# Patient Record
Sex: FEMALE | Race: OTHER | HISPANIC OR LATINO | URBAN - METROPOLITAN AREA
[De-identification: names, ages, dates, MRNs, and addresses within clinical notes are randomized per-mention and may not be internally consistent; named-entity substitution may affect disease eponyms.]

---

## 2021-07-22 ENCOUNTER — OFFICE VISIT (OUTPATIENT)
Dept: URBAN - METROPOLITAN AREA CLINIC 121 | Facility: CLINIC | Age: 46
End: 2021-07-22

## 2022-02-03 ENCOUNTER — OFFICE VISIT (OUTPATIENT)
Dept: URBAN - METROPOLITAN AREA CLINIC 60 | Facility: CLINIC | Age: 47
End: 2022-02-03

## 2022-03-28 ENCOUNTER — OFFICE VISIT (OUTPATIENT)
Dept: URBAN - METROPOLITAN AREA CLINIC 60 | Facility: CLINIC | Age: 47
End: 2022-03-28

## 2022-05-13 ENCOUNTER — OFFICE VISIT (OUTPATIENT)
Dept: URBAN - METROPOLITAN AREA SURGERY CENTER 4 | Facility: SURGERY CENTER | Age: 47
End: 2022-05-13

## 2022-05-18 LAB — PATHOLOGY (INDENTED REPORT): (no result)

## 2022-05-23 ENCOUNTER — LAB OUTSIDE AN ENCOUNTER (OUTPATIENT)
Dept: URBAN - METROPOLITAN AREA CLINIC 121 | Facility: CLINIC | Age: 47
End: 2022-05-23

## 2022-05-26 LAB
% SATURATION: (no result)
ABSOLUTE BASOPHILS: (no result)
ABSOLUTE EOSINOPHILS: (no result)
ABSOLUTE LYMPHOCYTES: (no result)
ABSOLUTE MONOCYTES: (no result)
ABSOLUTE NEUTROPHILS: (no result)
ALBUMIN/GLOBULIN RATIO: (no result)
ALBUMIN: (no result)
ALKALINE PHOSPHATASE: (no result)
ALT: (no result)
AST: (no result)
BASOPHILS: (no result)
BILIRUBIN, TOTAL: (no result)
BUN/CREATININE RATIO: (no result)
CALCIUM: (no result)
CARBON DIOXIDE: (no result)
CHLORIDE: (no result)
CREATININE: (no result)
EGFR AFRICAN AMERIC: (no result)
EGFR NON-AFR. AMERI: (no result)
EOSINOPHILS: (no result)
FERRITIN: (no result)
FOLATE, SERUM: (no result)
GLOBULIN: (no result)
GLUCOSE: (no result)
HEMATOCRIT: (no result)
HEMOGLOBIN: (no result)
HEPATITIS A IGM: (no result)
HEPATITIS B CORE AB TOTAL: (no result)
HEPATITIS B CORE ANTIBODY (IGM): (no result)
HEPATITIS B SURFACE ANTIBODY QL: (no result)
HEPATITIS B SURFACE ANTIGEN: (no result)
HEPATITIS C ANTIBODY: (no result)
INDEX: 0.02
INR: 1
INTRINSIC FACTOR BLOCKING ANTIBODY: NEGATIVE
IRON BINDING CAPACITY: (no result)
IRON, TOTAL: (no result)
LYMPHOCYTES: (no result)
MCH: (no result)
MCHC: (no result)
MCV: (no result)
MITOCHONDRIAL AB SCREEN: NEGATIVE
MONOCYTES: (no result)
MPV: (no result)
NEUTROPHILS: (no result)
PLATELET COUNT: (no result)
POTASSIUM: (no result)
PROTEIN, TOTAL: (no result)
PT: (no result)
RDW: (no result)
RED BLOOD CELL COUNT: (no result)
SODIUM: (no result)
UREA NITROGEN (BUN): (no result)
VITAMIN B12: (no result)
WHITE BLOOD CELL COUNT: (no result)

## 2022-06-01 LAB
HEPATITIS A AB, TOTAL W/REFL IGM: REACTIVE
HEPATITIS A IGM: (no result)
IMMUNOGLOBULIN A: (no result)
INTERPRETATION: (no result)
TISSUE TRANSGLUTAMINASE AB, IGA: (no result)

## 2022-06-06 ENCOUNTER — OFFICE VISIT (OUTPATIENT)
Dept: URBAN - METROPOLITAN AREA CLINIC 60 | Facility: CLINIC | Age: 47
End: 2022-06-06

## 2022-07-09 ENCOUNTER — TELEPHONE ENCOUNTER (OUTPATIENT)
Dept: URBAN - METROPOLITAN AREA CLINIC 121 | Facility: CLINIC | Age: 47
End: 2022-07-09

## 2022-07-09 RX ORDER — OMEPRAZOLE 40 MG/1
TWICE A DAY; ONE CAPSULE 30MIN BEFORE BREAKFAST ONE CAPSULE 30MIN BEFORE DINNER CAPSULE, DELAYED RELEASE ORAL TWICE A DAY
Refills: 1 | OUTPATIENT
Start: 2022-05-13 | End: 2022-03-28

## 2022-07-09 RX ORDER — IBUPROFEN 800 MG/1
TABLET, FILM COATED ORAL
Refills: 0 | OUTPATIENT
Start: 2022-01-12 | End: 2022-06-06

## 2022-07-09 RX ORDER — SUCRALFATE 1 G/1
FOUR TIMES A DAY; TAKE ON EMPTY STOMACH 1 HR BEFORE OR 2 HOURS AFTER MEALS/OTHER MEDICATION TABLET ORAL
Refills: 1 | OUTPATIENT
Start: 2022-06-06 | End: 2022-06-06

## 2022-07-09 RX ORDER — ONDANSETRON HYDROCHLORIDE 4 MG/1
TABLET, FILM COATED ORAL
Refills: 0 | OUTPATIENT
Start: 2021-11-15 | End: 2022-06-06

## 2022-07-10 ENCOUNTER — TELEPHONE ENCOUNTER (OUTPATIENT)
Dept: URBAN - METROPOLITAN AREA CLINIC 121 | Facility: CLINIC | Age: 47
End: 2022-07-10

## 2022-07-10 RX ORDER — ONDANSETRON HYDROCHLORIDE 4 MG/1
TAKE ONE TABLET PO 30 MINS BEFORE COLONOSCOPY PREP AND ONE TABLET PO 6 HOURS INTO PREP TABLET, FILM COATED ORAL
Refills: 0 | Status: ACTIVE | COMMUNITY
Start: 2022-03-28

## 2022-07-10 RX ORDER — GABAPENTIN 300 MG/1
CAPSULE ORAL
Refills: 0 | Status: ACTIVE | COMMUNITY
Start: 2022-01-12

## 2022-07-10 RX ORDER — FUROSEMIDE 20 MG/1
TABLET ORAL
Refills: 0 | Status: ACTIVE | COMMUNITY
Start: 2021-11-15

## 2022-07-10 RX ORDER — OMEPRAZOLE 40 MG/1
TWICE A DAY; ONE CAPSULE 30MIN BEFORE BREAKFAST ONE CAPSULE 30MIN BEFORE DINNER CAPSULE, DELAYED RELEASE ORAL TWICE A DAY
Refills: 1 | Status: ACTIVE | COMMUNITY
Start: 2022-05-13

## 2022-07-10 RX ORDER — OMEPRAZOLE 40 MG/1
TAKE ONE CAPSULE BY MOUTH ONCE A DAY 30 MINS BEFORE BREAKFAST CAPSULE, DELAYED RELEASE ORAL ONCE A DAY
Refills: 1 | Status: ACTIVE | COMMUNITY
Start: 2022-03-28

## 2022-07-10 RX ORDER — TOPIRAMATE 100 MG/1
TABLET ORAL
Refills: 0 | Status: ACTIVE | COMMUNITY
Start: 2022-01-12

## 2022-07-10 RX ORDER — METOPROLOL SUCCINATE 25 MG/1
TABLET, EXTENDED RELEASE ORAL
Refills: 0 | Status: ACTIVE | COMMUNITY
Start: 2022-01-12

## 2022-07-10 RX ORDER — HYDROXYCHLOROQUINE SULFATE 200 MG/1
TABLET, FILM COATED ORAL
Refills: 0 | Status: ACTIVE | COMMUNITY
Start: 2022-03-25

## 2022-07-10 RX ORDER — CHOLECALCIFEROL (VITAMIN D3) 1250 MCG
CAPSULE ORAL
Refills: 0 | Status: ACTIVE | COMMUNITY
Start: 2021-08-07

## 2022-07-10 RX ORDER — BUPROPION HYDROCHLORIDE 300 MG/1
TABLET, EXTENDED RELEASE ORAL
Refills: 0 | Status: ACTIVE | COMMUNITY
Start: 2021-07-29

## 2022-07-10 RX ORDER — OMEPRAZOLE 40 MG/1
TWICE A DAY; ONE CAPSULE 30MIN BEFORE BREAKFAST ONE CAPSULE 30MIN BEFORE DINNER CAPSULE, DELAYED RELEASE ORAL TWICE A DAY
Refills: 1 | Status: ACTIVE | COMMUNITY
Start: 2022-06-06

## 2022-07-10 RX ORDER — SUCRALFATE 1 G/1
FOUR TIMES A DAY; TAKE ON EMPTY STOMACH 1 HR BEFORE OR 2 HOURS AFTER MEALS/OTHER MEDICATION TABLET ORAL
Refills: 1 | Status: ACTIVE | COMMUNITY
Start: 2022-03-28

## 2022-07-10 RX ORDER — PROMETHAZINE HYDROCHLORIDE AND DEXTROMETHORPHAN HYDROBROMIDE ORAL SOLUTION 15; 6.25 MG/5ML; MG/5ML
SOLUTION ORAL
Refills: 0 | Status: ACTIVE | COMMUNITY
Start: 2021-11-27

## 2022-07-10 RX ORDER — FAMOTIDINE 40 MG/1
TABLET, FILM COATED ORAL
Refills: 0 | Status: ACTIVE | COMMUNITY
Start: 2021-08-07

## 2022-07-10 RX ORDER — SUCRALFATE 1 G/1
TAKE 1 TABLET BY MOUTH ON AN EMPTY STOMACH FOUR TIMES DAILY ONE HOUR BEFORE OR 2 HOURS AFTER MEALS/ OTHER MEDICATION TABLET ORAL
Refills: 1 | Status: ACTIVE | COMMUNITY
Start: 2022-06-06

## 2022-07-10 RX ORDER — IBUPROFEN 800 MG/1
TABLET, FILM COATED ORAL
Refills: 0 | Status: ACTIVE | COMMUNITY
Start: 2022-02-21

## 2022-07-10 RX ORDER — ALBUTEROL SULFATE 90 UG/1
INHALANT RESPIRATORY (INHALATION)
Refills: 0 | Status: ACTIVE | COMMUNITY
Start: 2021-11-27

## 2022-07-10 RX ORDER — POLYETHYLENE GLYCOL 3350, SODIUM SULFATE ANHYDROUS, SODIUM BICARBONATE, SODIUM CHLORIDE, POTASSIUM CHLORIDE 236; 22.74; 6.74; 5.86; 2.97 G/4L; G/4L; G/4L; G/4L; G/4L
TAKE AS DIRECTED POWDER, FOR SOLUTION ORAL TAKE AS DIRECTED
Refills: 0 | Status: ACTIVE | COMMUNITY
Start: 2022-03-28

## 2022-07-11 PROBLEM — 698065002 ACID REFLUX: Status: ACTIVE | Noted: 2022-07-11

## 2022-07-15 ENCOUNTER — CLAIMS CREATED FROM THE CLAIM WINDOW (OUTPATIENT)
Dept: URBAN - METROPOLITAN AREA SURGERY CENTER 4 | Facility: SURGERY CENTER | Age: 47
End: 2022-07-15
Payer: COMMERCIAL

## 2022-07-15 DIAGNOSIS — K29.70 GASTRITIS, UNSPECIFIED, WITHOUT BLEEDING: ICD-10-CM

## 2022-07-15 DIAGNOSIS — K20.90 ESOPHAGITIS, UNSPECIFIED WITHOUT BLEEDING: ICD-10-CM

## 2022-07-15 DIAGNOSIS — K44.9 HERNIA, HIATAL: ICD-10-CM

## 2022-07-15 PROCEDURE — G8907 PT DOC NO EVENTS ON DISCHARG: HCPCS | Performed by: INTERNAL MEDICINE

## 2022-07-15 PROCEDURE — 43239 EGD BIOPSY SINGLE/MULTIPLE: CPT | Performed by: INTERNAL MEDICINE

## 2022-07-15 PROCEDURE — G8918 PT W/O PREOP ORDER IV AB PRO: HCPCS | Performed by: INTERNAL MEDICINE

## 2022-07-15 RX ORDER — GABAPENTIN 300 MG/1
CAPSULE ORAL
Refills: 0 | Status: ACTIVE | COMMUNITY
Start: 2022-01-12

## 2022-07-15 RX ORDER — IBUPROFEN 800 MG/1
TABLET, FILM COATED ORAL
Refills: 0 | Status: ACTIVE | COMMUNITY
Start: 2022-02-21

## 2022-07-15 RX ORDER — FUROSEMIDE 20 MG/1
TABLET ORAL
Refills: 0 | Status: ACTIVE | COMMUNITY
Start: 2021-11-15

## 2022-07-15 RX ORDER — FAMOTIDINE 40 MG/1
TABLET, FILM COATED ORAL
Refills: 0 | Status: ACTIVE | COMMUNITY
Start: 2021-08-07

## 2022-07-15 RX ORDER — OMEPRAZOLE 40 MG/1
TAKE ONE CAPSULE BY MOUTH ONCE A DAY 30 MINS BEFORE BREAKFAST CAPSULE, DELAYED RELEASE ORAL ONCE A DAY
Refills: 1 | Status: ACTIVE | COMMUNITY
Start: 2022-03-28

## 2022-07-15 RX ORDER — SUCRALFATE 1 G/1
TAKE 1 TABLET BY MOUTH ON AN EMPTY STOMACH FOUR TIMES DAILY ONE HOUR BEFORE OR 2 HOURS AFTER MEALS/ OTHER MEDICATION TABLET ORAL
Refills: 1 | Status: ACTIVE | COMMUNITY
Start: 2022-06-06

## 2022-07-15 RX ORDER — CHOLECALCIFEROL (VITAMIN D3) 1250 MCG
CAPSULE ORAL
Refills: 0 | Status: ACTIVE | COMMUNITY
Start: 2021-08-07

## 2022-07-15 RX ORDER — HYDROXYCHLOROQUINE SULFATE 200 MG/1
TABLET, FILM COATED ORAL
Refills: 0 | Status: ACTIVE | COMMUNITY
Start: 2022-03-25

## 2022-07-15 RX ORDER — POLYETHYLENE GLYCOL 3350, SODIUM SULFATE ANHYDROUS, SODIUM BICARBONATE, SODIUM CHLORIDE, POTASSIUM CHLORIDE 236; 22.74; 6.74; 5.86; 2.97 G/4L; G/4L; G/4L; G/4L; G/4L
TAKE AS DIRECTED POWDER, FOR SOLUTION ORAL TAKE AS DIRECTED
Refills: 0 | Status: ACTIVE | COMMUNITY
Start: 2022-03-28

## 2022-07-15 RX ORDER — ALBUTEROL SULFATE 90 UG/1
INHALANT RESPIRATORY (INHALATION)
Refills: 0 | Status: ACTIVE | COMMUNITY
Start: 2021-11-27

## 2022-07-15 RX ORDER — TOPIRAMATE 100 MG/1
TABLET ORAL
Refills: 0 | Status: ACTIVE | COMMUNITY
Start: 2022-01-12

## 2022-07-15 RX ORDER — PROMETHAZINE HYDROCHLORIDE AND DEXTROMETHORPHAN HYDROBROMIDE ORAL SOLUTION 15; 6.25 MG/5ML; MG/5ML
SOLUTION ORAL
Refills: 0 | Status: ACTIVE | COMMUNITY
Start: 2021-11-27

## 2022-07-15 RX ORDER — BUPROPION HYDROCHLORIDE 300 MG/1
TABLET, EXTENDED RELEASE ORAL
Refills: 0 | Status: ACTIVE | COMMUNITY
Start: 2021-07-29

## 2022-07-15 RX ORDER — METOPROLOL SUCCINATE 25 MG/1
TABLET, EXTENDED RELEASE ORAL
Refills: 0 | Status: ACTIVE | COMMUNITY
Start: 2022-01-12

## 2022-07-15 RX ORDER — ONDANSETRON HYDROCHLORIDE 4 MG/1
TAKE ONE TABLET PO 30 MINS BEFORE COLONOSCOPY PREP AND ONE TABLET PO 6 HOURS INTO PREP TABLET, FILM COATED ORAL
Refills: 0 | Status: ACTIVE | COMMUNITY
Start: 2022-03-28

## 2022-07-30 ENCOUNTER — TELEPHONE ENCOUNTER (OUTPATIENT)
Age: 47
End: 2022-07-30

## 2022-07-31 ENCOUNTER — TELEPHONE ENCOUNTER (OUTPATIENT)
Age: 47
End: 2022-07-31

## 2022-07-31 RX ORDER — FERROUS SULFATE 325(65) MG
1 (ONE) TABLET ORAL
Qty: 0 | Refills: 4 | Status: ACTIVE | COMMUNITY
Start: 2017-11-15

## 2022-08-01 ENCOUNTER — OFFICE VISIT (OUTPATIENT)
Dept: URBAN - METROPOLITAN AREA CLINIC 60 | Facility: CLINIC | Age: 47
End: 2022-08-01
Payer: COMMERCIAL

## 2022-08-01 ENCOUNTER — WEB ENCOUNTER (OUTPATIENT)
Dept: URBAN - METROPOLITAN AREA CLINIC 60 | Facility: CLINIC | Age: 47
End: 2022-08-01

## 2022-08-01 VITALS
HEIGHT: 64 IN | DIASTOLIC BLOOD PRESSURE: 80 MMHG | TEMPERATURE: 97.6 F | WEIGHT: 246 LBS | SYSTOLIC BLOOD PRESSURE: 126 MMHG | BODY MASS INDEX: 42 KG/M2 | HEART RATE: 82 BPM | RESPIRATION RATE: 20 BRPM | OXYGEN SATURATION: 96 %

## 2022-08-01 DIAGNOSIS — K59.00 CONSTIPATION, UNSPECIFIED CONSTIPATION TYPE: ICD-10-CM

## 2022-08-01 DIAGNOSIS — K76.0 FATTY LIVER: ICD-10-CM

## 2022-08-01 DIAGNOSIS — R10.10 UPPER ABDOMINAL PAIN: ICD-10-CM

## 2022-08-01 DIAGNOSIS — R16.1 SPLENOMEGALY: ICD-10-CM

## 2022-08-01 DIAGNOSIS — K21.00 GASTROESOPHAGEAL REFLUX DISEASE WITH ESOPHAGITIS WITHOUT HEMORRHAGE: ICD-10-CM

## 2022-08-01 DIAGNOSIS — Z86.010 PERSONAL HISTORY OF COLONIC POLYPS: ICD-10-CM

## 2022-08-01 PROBLEM — 429699009: Status: RESOLVED | Noted: 2022-07-29

## 2022-08-01 PROCEDURE — 99213 OFFICE O/P EST LOW 20 MIN: CPT | Performed by: PHYSICIAN ASSISTANT

## 2022-08-01 RX ORDER — ALBUTEROL SULFATE 90 UG/1
INHALANT RESPIRATORY (INHALATION)
Refills: 0 | Status: ACTIVE | COMMUNITY
Start: 2021-11-27

## 2022-08-01 RX ORDER — CHOLECALCIFEROL (VITAMIN D3) 1250 MCG
CAPSULE ORAL
Refills: 0 | Status: ACTIVE | COMMUNITY
Start: 2021-08-07

## 2022-08-01 RX ORDER — BUPROPION HYDROCHLORIDE 300 MG/1
TABLET, EXTENDED RELEASE ORAL
Refills: 0 | Status: ACTIVE | COMMUNITY
Start: 2021-07-29

## 2022-08-01 RX ORDER — IBUPROFEN 800 MG/1
TABLET, FILM COATED ORAL
Refills: 0 | Status: ACTIVE | COMMUNITY
Start: 2022-02-21

## 2022-08-01 RX ORDER — GABAPENTIN 300 MG/1
CAPSULE ORAL
Refills: 0 | Status: ACTIVE | COMMUNITY
Start: 2022-01-12

## 2022-08-01 RX ORDER — HYDROXYCHLOROQUINE SULFATE 200 MG/1
TABLET, FILM COATED ORAL
Refills: 0 | Status: ACTIVE | COMMUNITY
Start: 2022-03-25

## 2022-08-01 RX ORDER — FAMOTIDINE 40 MG/1
TABLET, FILM COATED ORAL
Refills: 0 | Status: ACTIVE | COMMUNITY
Start: 2021-08-07

## 2022-08-01 RX ORDER — OMEPRAZOLE 40 MG/1
TAKE ONE CAPSULE BY MOUTH ONCE A DAY 30 MINS BEFORE BREAKFAST CAPSULE, DELAYED RELEASE ORAL ONCE A DAY
Refills: 1 | Status: ACTIVE | COMMUNITY
Start: 2022-03-28

## 2022-08-01 RX ORDER — ONDANSETRON HYDROCHLORIDE 4 MG/1
TAKE ONE TABLET PO 30 MINS BEFORE COLONOSCOPY PREP AND ONE TABLET PO 6 HOURS INTO PREP TABLET, FILM COATED ORAL
Refills: 0 | Status: ACTIVE | COMMUNITY
Start: 2022-03-28

## 2022-08-01 RX ORDER — TOPIRAMATE 100 MG/1
TABLET ORAL
Refills: 0 | Status: ACTIVE | COMMUNITY
Start: 2022-01-12

## 2022-08-01 RX ORDER — PROMETHAZINE HYDROCHLORIDE AND DEXTROMETHORPHAN HYDROBROMIDE ORAL SOLUTION 15; 6.25 MG/5ML; MG/5ML
SOLUTION ORAL
Refills: 0 | Status: ACTIVE | COMMUNITY
Start: 2021-11-27

## 2022-08-01 RX ORDER — METOPROLOL SUCCINATE 25 MG/1
TABLET, EXTENDED RELEASE ORAL
Refills: 0 | Status: ACTIVE | COMMUNITY
Start: 2022-01-12

## 2022-08-01 RX ORDER — PREDNISONE 5 MG/1
1 TABLET TABLET ORAL BID
Status: ACTIVE | COMMUNITY

## 2022-08-01 RX ORDER — FUROSEMIDE 20 MG/1
TABLET ORAL
Refills: 0 | Status: ACTIVE | COMMUNITY
Start: 2021-11-15

## 2022-08-01 NOTE — HPI-TODAY'S VISIT:
Radha is a pleasant 47-year-old female who presents today for procedure follow up.   MRI/pancreas/MRCP with and without contrast dated 6/27/2022 showed splenomegaly  EGD dated 7/15/2022 demonstrated a regular Z-line.  Small hiatal hernia.  Gastritis.  Normal duodenum.  Evidence of gastric sleeve. Lower third esophageal biopsies demonstrated squamocolumnar mucosa with mild reflux type changes and mild chronic inflammation of columnar gastric type mucosa. No evidence of intestinal metaplasia, or dysplasia.   FibroScan dated 4/18/2022 showed S0 and F0    Ultrasound dated 4/18/2022 showed interval cholecystectomy with normal caliber common bile duct.  Spleen at the upper limits of normal in size but unchanged from prior exam.  Otherwise negative exam    EGD dated 5/13/2022 showed LA grade B esophagitis.  Vertical banded gastroplasty.  Gastritis.  Normal duodenum. Small bowel mucosa without significant histopathologic diagnosis.  No evidence of villous atrophy or sprue.  Small bowel mucosa with Brunner's glands.  Antral mucosa negative for H. pylori.  Lower third esophageal biopsies with erosive esophagitis.    Labs dated 5/23/2022 showed a normal CBC.  Normal CMP.  Hepatitis panel negative.  Intrinsic factor blocking antibody negative.  Iron studies normal.  AMA normal.  PT/INR normal.  Vitamin B12/folate normal.    Colonoscopy dated 5/13/2022 showed a 5 mm tubular adenoma removed from the mid ascending colon.  Diverticulosis in the sigmoid colon.  Ileum normal.  Nonbleeding internal hemorrhoids.  Repeat colonoscopy in 1 year due to suboptimal prep.    Gastric parietal cell antibody dated 1/10/2022 normal    No issues after procedure. S/P gastric sleeve. Improvement in nausea, vomiting, heartburn, reflux and epigastric pain with omeprazole 40 mg BID and sucralfate. Notes a bowel movement every other day with MiraLAX. Denies dysphagia, odynophagia, hematemesis, coffee-ground emesis, abnormal weight loss, BRBPR or melena. Maternal history of colon cancer at age 51.  No other GI complaints at this time

## 2022-08-01 NOTE — PHYSICAL EXAM CONSTITUTIONAL:
obese, well developed, well nourished , in no acute distress , ambulating without difficulty , normal communication ability

## 2022-08-03 PROBLEM — 196731005 GASTRODUODENITIS: Status: ACTIVE | Noted: 2022-08-03

## 2022-11-07 ENCOUNTER — OFFICE VISIT (OUTPATIENT)
Dept: URBAN - METROPOLITAN AREA CLINIC 60 | Facility: CLINIC | Age: 47
End: 2022-11-07
Payer: COMMERCIAL

## 2022-11-07 VITALS
HEIGHT: 64 IN | WEIGHT: 252 LBS | BODY MASS INDEX: 43.02 KG/M2 | SYSTOLIC BLOOD PRESSURE: 128 MMHG | HEART RATE: 78 BPM | RESPIRATION RATE: 20 BRPM | TEMPERATURE: 97.4 F | DIASTOLIC BLOOD PRESSURE: 78 MMHG | OXYGEN SATURATION: 99 %

## 2022-11-07 DIAGNOSIS — K21.00 GASTROESOPHAGEAL REFLUX DISEASE WITH ESOPHAGITIS WITHOUT HEMORRHAGE: ICD-10-CM

## 2022-11-07 DIAGNOSIS — R16.1 SPLENOMEGALY: ICD-10-CM

## 2022-11-07 DIAGNOSIS — K59.00 CONSTIPATION, UNSPECIFIED CONSTIPATION TYPE: ICD-10-CM

## 2022-11-07 DIAGNOSIS — Z86.010 PERSONAL HISTORY OF COLONIC POLYPS: ICD-10-CM

## 2022-11-07 PROBLEM — 428283002: Status: ACTIVE | Noted: 2022-08-01

## 2022-11-07 PROBLEM — 266433003: Status: ACTIVE | Noted: 2022-07-29

## 2022-11-07 PROBLEM — 14760008: Status: ACTIVE | Noted: 2022-07-29

## 2022-11-07 PROBLEM — 197321007: Status: ACTIVE | Noted: 2022-07-29

## 2022-11-07 PROCEDURE — 99213 OFFICE O/P EST LOW 20 MIN: CPT | Performed by: PHYSICIAN ASSISTANT

## 2022-11-07 RX ORDER — IBUPROFEN 800 MG/1
TABLET, FILM COATED ORAL
Refills: 0 | COMMUNITY
Start: 2022-02-21

## 2022-11-07 RX ORDER — SUCRALFATE 1 G/1
1 TABLET ON AN EMPTY STOMACH TABLET ORAL TWICE A DAY
Qty: 180 TABLET | Refills: 1 | OUTPATIENT

## 2022-11-07 RX ORDER — FAMOTIDINE 40 MG/1
TABLET, FILM COATED ORAL
Refills: 0 | COMMUNITY
Start: 2021-08-07

## 2022-11-07 RX ORDER — ALBUTEROL SULFATE 90 UG/1
INHALANT RESPIRATORY (INHALATION)
Refills: 0 | COMMUNITY
Start: 2021-11-27

## 2022-11-07 RX ORDER — OMEPRAZOLE 40 MG/1
TAKE ONE CAPSULE BY MOUTH ONCE A DAY 30 MINS BEFORE BREAKFAST CAPSULE, DELAYED RELEASE ORAL ONCE A DAY
Refills: 1 | COMMUNITY
Start: 2022-03-28

## 2022-11-07 RX ORDER — PREDNISONE 5 MG/1
1 TABLET TABLET ORAL BID
COMMUNITY

## 2022-11-07 RX ORDER — METOPROLOL SUCCINATE 25 MG/1
TABLET, EXTENDED RELEASE ORAL
Refills: 0 | COMMUNITY
Start: 2022-01-12

## 2022-11-07 RX ORDER — OMEPRAZOLE 40 MG/1
1 CAPSULE 30 MINUTES BEFORE MORNING MEAL CAPSULE, DELAYED RELEASE ORAL ONCE A DAY
Qty: 90 CAPSULE | Refills: 3 | OUTPATIENT

## 2022-11-07 RX ORDER — GABAPENTIN 300 MG/1
CAPSULE ORAL
Refills: 0 | COMMUNITY
Start: 2022-01-12

## 2022-11-07 RX ORDER — HYDROXYCHLOROQUINE SULFATE 200 MG/1
TABLET, FILM COATED ORAL
Refills: 0 | COMMUNITY
Start: 2022-03-25

## 2022-11-07 RX ORDER — TOPIRAMATE 100 MG/1
TABLET ORAL
Refills: 0 | COMMUNITY
Start: 2022-01-12

## 2022-11-07 RX ORDER — CHOLECALCIFEROL (VITAMIN D3) 1250 MCG
CAPSULE ORAL
Refills: 0 | COMMUNITY
Start: 2021-08-07

## 2022-11-07 RX ORDER — FUROSEMIDE 20 MG/1
TABLET ORAL
Refills: 0 | COMMUNITY
Start: 2021-11-15

## 2022-11-07 NOTE — HPI-TODAY'S VISIT:
Radha is a pleasant 47-year-old female who presents today for follow up.   MRI/pancreas/MRCP with and without contrast dated 6/27/2022 showed splenomegaly  EGD dated 7/15/2022 demonstrated a regular Z-line.  Small hiatal hernia.  Gastritis.  Normal duodenum.  Evidence of gastric sleeve. Lower third esophageal biopsies demonstrated squamocolumnar mucosa with mild reflux type changes and mild chronic inflammation of columnar gastric type mucosa. No evidence of intestinal metaplasia, or dysplasia.   FibroScan dated 4/18/2022 showed S0 and F0    Ultrasound dated 4/18/2022 showed interval cholecystectomy with normal caliber common bile duct.  Spleen at the upper limits of normal in size but unchanged from prior exam.  Otherwise negative exam    EGD dated 5/13/2022 showed LA grade B esophagitis.  Vertical banded gastroplasty.  Gastritis.  Normal duodenum. Small bowel mucosa without significant histopathologic diagnosis.  No evidence of villous atrophy or sprue.  Small bowel mucosa with Brunner's glands.  Antral mucosa negative for H. pylori.  Lower third esophageal biopsies with erosive esophagitis.    Labs dated 5/23/2022 showed a normal CBC.  Normal CMP.  Hepatitis panel negative.  Intrinsic factor blocking antibody negative.  Iron studies normal.  AMA normal.  PT/INR normal.  Vitamin B12/folate normal.    Colonoscopy dated 5/13/2022 showed a 5 mm tubular adenoma removed from the mid ascending colon.  Diverticulosis in the sigmoid colon.  Ileum normal.  Nonbleeding internal hemorrhoids.  Repeat colonoscopy in 1 year due to suboptimal prep.    Gastric parietal cell antibody dated 1/10/2022 normal    S/P gastric sleeve.  Doing well with omeprazole 40 mg qd and sucralfate PRN. Notes a bowel movement every other day with MiraLAX. Denies nausea, vomiting, heartburn, reflux, dysphagia, odynophagia, hematemesis, coffee-ground emesis, abdominal pain, change in bowel habits, abnormal weight loss, BRBPR or melena. Maternal history of colon cancer at age 51.  No other GI complaints at this time

## 2023-03-06 ENCOUNTER — OFFICE VISIT (OUTPATIENT)
Dept: URBAN - METROPOLITAN AREA CLINIC 60 | Facility: CLINIC | Age: 48
End: 2023-03-06

## 2024-02-28 ENCOUNTER — OV EP (OUTPATIENT)
Dept: URBAN - METROPOLITAN AREA CLINIC 63 | Facility: CLINIC | Age: 49
End: 2024-02-28
Payer: COMMERCIAL

## 2024-02-28 VITALS
HEART RATE: 81 BPM | BODY MASS INDEX: 43.36 KG/M2 | OXYGEN SATURATION: 98 % | HEIGHT: 64 IN | TEMPERATURE: 97 F | WEIGHT: 254 LBS | SYSTOLIC BLOOD PRESSURE: 100 MMHG | DIASTOLIC BLOOD PRESSURE: 70 MMHG

## 2024-02-28 DIAGNOSIS — Z86.010 PERSONAL HISTORY OF COLONIC POLYPS: ICD-10-CM

## 2024-02-28 DIAGNOSIS — R16.1 SPLENOMEGALY: ICD-10-CM

## 2024-02-28 DIAGNOSIS — K59.09 OTHER CONSTIPATION: ICD-10-CM

## 2024-02-28 DIAGNOSIS — K21.00 GASTROESOPHAGEAL REFLUX DISEASE WITH ESOPHAGITIS WITHOUT HEMORRHAGE: ICD-10-CM

## 2024-02-28 PROCEDURE — 99214 OFFICE O/P EST MOD 30 MIN: CPT | Performed by: PHYSICIAN ASSISTANT

## 2024-02-28 RX ORDER — CHOLECALCIFEROL (VITAMIN D3) 1250 MCG
CAPSULE ORAL
Refills: 0 | COMMUNITY
Start: 2021-08-07

## 2024-02-28 RX ORDER — GABAPENTIN 300 MG/1
CAPSULE ORAL
Refills: 0 | COMMUNITY
Start: 2022-01-12

## 2024-02-28 RX ORDER — SUCRALFATE 1 G/1
1 TABLET ON AN EMPTY STOMACH TABLET ORAL TWICE A DAY
Qty: 180 TABLET | Refills: 1 | Status: ACTIVE | COMMUNITY

## 2024-02-28 RX ORDER — TOPIRAMATE 100 MG/1
TABLET ORAL
Refills: 0 | COMMUNITY
Start: 2022-01-12

## 2024-02-28 RX ORDER — ALBUTEROL SULFATE 90 UG/1
INHALANT RESPIRATORY (INHALATION)
Refills: 0 | COMMUNITY
Start: 2021-11-27

## 2024-02-28 RX ORDER — FAMOTIDINE 40 MG/1
TABLET, FILM COATED ORAL
Refills: 0 | COMMUNITY
Start: 2021-08-07

## 2024-02-28 RX ORDER — METOPROLOL SUCCINATE 25 MG/1
TABLET, EXTENDED RELEASE ORAL
Refills: 0 | COMMUNITY
Start: 2022-01-12

## 2024-02-28 RX ORDER — POLYETHYLENE GLYCOL 3350, SODIUM CHLORIDE, SODIUM BICARBONATE, POTASSIUM CHLORIDE 420; 11.2; 5.72; 1.48 G/4L; G/4L; G/4L; G/4L
AS DIRECTED POWDER, FOR SOLUTION ORAL ONCE
Qty: 4000 | Refills: 0 | OUTPATIENT
Start: 2024-02-28 | End: 2024-02-29

## 2024-02-28 RX ORDER — OMEPRAZOLE 40 MG/1
1 CAPSULE 30 MINUTES BEFORE MORNING MEAL CAPSULE, DELAYED RELEASE ORAL ONCE A DAY
Qty: 90 CAPSULE | Refills: 3 | Status: ACTIVE | COMMUNITY

## 2024-02-28 RX ORDER — FUROSEMIDE 20 MG/1
TABLET ORAL
Refills: 0 | COMMUNITY
Start: 2021-11-15

## 2024-02-28 RX ORDER — PREDNISONE 5 MG/1
1 TABLET TABLET ORAL BID
COMMUNITY

## 2024-02-28 RX ORDER — OMEPRAZOLE 40 MG/1
TAKE ONE CAPSULE BY MOUTH ONCE A DAY 30 MINS BEFORE BREAKFAST CAPSULE, DELAYED RELEASE ORAL ONCE A DAY
Refills: 1 | COMMUNITY
Start: 2022-03-28

## 2024-02-28 RX ORDER — OMEPRAZOLE 40 MG/1
1 CAPSULE 30 MINUTES BEFORE MEALS CAPSULE, DELAYED RELEASE ORAL TWICE A DAY
Qty: 60 CAPSULE | Refills: 2 | OUTPATIENT

## 2024-02-28 RX ORDER — HYDROXYCHLOROQUINE SULFATE 200 MG/1
TABLET, FILM COATED ORAL
Refills: 0 | COMMUNITY
Start: 2022-03-25

## 2024-02-28 RX ORDER — ONDANSETRON HYDROCHLORIDE 4 MG/1
1 TABLET TABLET, FILM COATED ORAL
Qty: 2 | Refills: 0 | OUTPATIENT
Start: 2024-02-28

## 2024-02-28 RX ORDER — IBUPROFEN 800 MG/1
TABLET, FILM COATED ORAL
Refills: 0 | COMMUNITY
Start: 2022-02-21

## 2024-02-28 NOTE — HPI-TODAY'S VISIT:
Radha is a pleasant 49-year-old female who presents today for a follow up.   Labs dated 1/9/2024 showed hemoglobin 11.2.  Hematocrit 34.5.  Normal platelets.  Sed rate normal.  Normal CMP.  CRP 15.9.  Hep C antibody negative.  Hep B core antibody IgM negative.  Hep A antibody IgM negative.  Hep B surface antigen negative.  CT renal stone protocol dated 4/28/2023 showed no acute abnormality  MRI/pancreas/MRCP with and without contrast dated 6/27/2022 showed splenomegaly  EGD dated 7/15/2022 demonstrated a regular Z-line.  Small hiatal hernia.  Gastritis.  Normal duodenum.  Evidence of gastric sleeve. Lower third esophageal biopsies demonstrated squamocolumnar mucosa with mild reflux type changes and mild chronic inflammation of columnar gastric type mucosa. No evidence of intestinal metaplasia, or dysplasia.   FibroScan dated 4/18/2022 showed S0 and F0    Ultrasound dated 4/18/2022 showed interval cholecystectomy with normal caliber common bile duct.  Spleen at the upper limits of normal in size but unchanged from prior exam.  Otherwise negative exam    EGD dated 5/13/2022 showed LA grade B esophagitis.  Vertical banded gastroplasty.  Gastritis.  Normal duodenum. Small bowel mucosa without significant histopathologic diagnosis.  No evidence of villous atrophy or sprue.  Small bowel mucosa with Brunner's glands.  Antral mucosa negative for H. pylori.  Lower third esophageal biopsies with erosive esophagitis.    Colonoscopy dated 5/13/2022 showed a 5 mm tubular adenoma removed from the mid ascending colon.  Diverticulosis in the sigmoid colon.  Ileum normal.  Nonbleeding internal hemorrhoids.  Repeat colonoscopy in 1 year due to suboptimal prep.    S/P gastric sleeve. She notes worsening reflux. Currently on omeprazole 40 mg qd and sucralfate PRN. Notes a bowel movement every other day with MiraLAX. Denies nausea, vomiting, dysphagia, odynophagia, hematemesis, coffee-ground emesis, abdominal pain, change in bowel habits, abnormal weight loss, BRBPR or melena. Maternal history of colon cancer at age 51.  No other GI complaints at this time

## 2024-03-06 ENCOUNTER — LAB (OUTPATIENT)
Dept: URBAN - METROPOLITAN AREA CLINIC 63 | Facility: CLINIC | Age: 49
End: 2024-03-06

## 2024-03-08 ENCOUNTER — COL/EGD (OUTPATIENT)
Dept: URBAN - METROPOLITAN AREA SURGERY CENTER 4 | Facility: SURGERY CENTER | Age: 49
End: 2024-03-08
Payer: COMMERCIAL

## 2024-03-08 DIAGNOSIS — K44.9 DIAPHRAGMATIC HERNIA WITHOUT OBSTRUCTION OR GANGRENE: ICD-10-CM

## 2024-03-08 DIAGNOSIS — K57.30 DIVERTICULOSIS OF LARGE INTESTINE WITHOUT PERFORATION OR ABSCESS WITHOUT BLEEDING: ICD-10-CM

## 2024-03-08 DIAGNOSIS — Z86.010 PERSONAL HISTORY OF COLONIC POLYPS: ICD-10-CM

## 2024-03-08 DIAGNOSIS — K64.8 OTHER HEMORRHOIDS: ICD-10-CM

## 2024-03-08 DIAGNOSIS — K29.70 GASTRITIS WITHOUT BLEEDING, UNSPECIFIED CHRONICITY, UNSPECIFIED GASTRITIS TYPE: ICD-10-CM

## 2024-03-08 PROCEDURE — 43239 EGD BIOPSY SINGLE/MULTIPLE: CPT | Performed by: INTERNAL MEDICINE

## 2024-03-08 PROCEDURE — 45378 DIAGNOSTIC COLONOSCOPY: CPT | Performed by: INTERNAL MEDICINE

## 2024-03-08 RX ORDER — ALBUTEROL SULFATE 90 UG/1
INHALANT RESPIRATORY (INHALATION)
Refills: 0 | COMMUNITY
Start: 2021-11-27

## 2024-03-08 RX ORDER — OMEPRAZOLE 40 MG/1
1 CAPSULE 30 MINUTES BEFORE MEALS CAPSULE, DELAYED RELEASE ORAL TWICE A DAY
Qty: 60 CAPSULE | Refills: 2 | Status: ACTIVE | COMMUNITY

## 2024-03-08 RX ORDER — IBUPROFEN 800 MG/1
TABLET, FILM COATED ORAL
Refills: 0 | COMMUNITY
Start: 2022-02-21

## 2024-03-08 RX ORDER — FAMOTIDINE 40 MG/1
TABLET, FILM COATED ORAL
Refills: 0 | COMMUNITY
Start: 2021-08-07

## 2024-03-08 RX ORDER — GABAPENTIN 300 MG/1
CAPSULE ORAL
Refills: 0 | COMMUNITY
Start: 2022-01-12

## 2024-03-08 RX ORDER — FUROSEMIDE 20 MG/1
TABLET ORAL
Refills: 0 | COMMUNITY
Start: 2021-11-15

## 2024-03-08 RX ORDER — HYDROXYCHLOROQUINE SULFATE 200 MG/1
TABLET, FILM COATED ORAL
Refills: 0 | COMMUNITY
Start: 2022-03-25

## 2024-03-08 RX ORDER — CHOLECALCIFEROL (VITAMIN D3) 1250 MCG
CAPSULE ORAL
Refills: 0 | COMMUNITY
Start: 2021-08-07

## 2024-03-08 RX ORDER — TOPIRAMATE 100 MG/1
TABLET ORAL
Refills: 0 | COMMUNITY
Start: 2022-01-12

## 2024-03-08 RX ORDER — OMEPRAZOLE 40 MG/1
TAKE ONE CAPSULE BY MOUTH ONCE A DAY 30 MINS BEFORE BREAKFAST CAPSULE, DELAYED RELEASE ORAL ONCE A DAY
Refills: 1 | COMMUNITY
Start: 2022-03-28

## 2024-03-08 RX ORDER — SUCRALFATE 1 G/1
1 TABLET ON AN EMPTY STOMACH TABLET ORAL TWICE A DAY
Qty: 180 TABLET | Refills: 1 | Status: ACTIVE | COMMUNITY

## 2024-03-08 RX ORDER — METOPROLOL SUCCINATE 25 MG/1
TABLET, EXTENDED RELEASE ORAL
Refills: 0 | COMMUNITY
Start: 2022-01-12

## 2024-03-08 RX ORDER — PREDNISONE 5 MG/1
1 TABLET TABLET ORAL BID
COMMUNITY

## 2024-03-08 RX ORDER — ONDANSETRON HYDROCHLORIDE 4 MG/1
1 TABLET TABLET, FILM COATED ORAL
Qty: 2 | Refills: 0 | Status: ACTIVE | COMMUNITY
Start: 2024-02-28

## 2024-04-16 ENCOUNTER — OV EP (OUTPATIENT)
Dept: URBAN - METROPOLITAN AREA CLINIC 63 | Facility: CLINIC | Age: 49
End: 2024-04-16
Payer: COMMERCIAL

## 2024-04-16 VITALS
DIASTOLIC BLOOD PRESSURE: 80 MMHG | SYSTOLIC BLOOD PRESSURE: 130 MMHG | TEMPERATURE: 97.8 F | WEIGHT: 256 LBS | HEIGHT: 64 IN | BODY MASS INDEX: 43.71 KG/M2 | HEART RATE: 80 BPM | OXYGEN SATURATION: 97 %

## 2024-04-16 DIAGNOSIS — Z86.010 PERSONAL HISTORY OF COLONIC POLYPS: ICD-10-CM

## 2024-04-16 DIAGNOSIS — K59.09 OTHER CONSTIPATION: ICD-10-CM

## 2024-04-16 DIAGNOSIS — K21.00 GASTROESOPHAGEAL REFLUX DISEASE WITH ESOPHAGITIS WITHOUT HEMORRHAGE: ICD-10-CM

## 2024-04-16 DIAGNOSIS — R16.1 SPLENOMEGALY: ICD-10-CM

## 2024-04-16 PROCEDURE — 99213 OFFICE O/P EST LOW 20 MIN: CPT | Performed by: PHYSICIAN ASSISTANT

## 2024-04-16 RX ORDER — FUROSEMIDE 20 MG/1
TABLET ORAL
Refills: 0 | Status: ACTIVE | COMMUNITY
Start: 2021-11-15

## 2024-04-16 RX ORDER — CHOLECALCIFEROL (VITAMIN D3) 1250 MCG
CAPSULE ORAL
Refills: 0 | Status: ACTIVE | COMMUNITY
Start: 2021-08-07

## 2024-04-16 RX ORDER — FREMANEZUMAB-VFRM 225 MG/1.5ML
AS DIRECTED INJECTION SUBCUTANEOUS
Status: ACTIVE | COMMUNITY

## 2024-04-16 RX ORDER — PREDNISONE 5 MG/1
1 TABLET TABLET ORAL BID
Status: ACTIVE | COMMUNITY

## 2024-04-16 RX ORDER — HYDROXYCHLOROQUINE SULFATE 200 MG/1
TABLET, FILM COATED ORAL
Refills: 0 | Status: DISCONTINUED | COMMUNITY
Start: 2022-03-25

## 2024-04-16 RX ORDER — GABAPENTIN 300 MG/1
CAPSULE ORAL
Refills: 0 | Status: ACTIVE | COMMUNITY
Start: 2022-01-12

## 2024-04-16 RX ORDER — TOPIRAMATE 100 MG/1
TABLET ORAL
Refills: 0 | Status: ACTIVE | COMMUNITY
Start: 2022-01-12

## 2024-04-16 RX ORDER — ADALIMUMAB 40MG/0.4ML
AS DIRECTED KIT SUBCUTANEOUS
Status: ACTIVE | COMMUNITY

## 2024-04-16 RX ORDER — ALBUTEROL SULFATE 90 UG/1
INHALANT RESPIRATORY (INHALATION)
Refills: 0 | Status: ACTIVE | COMMUNITY
Start: 2021-11-27

## 2024-04-16 RX ORDER — SUCRALFATE 1 G/1
1 TABLET ON AN EMPTY STOMACH TABLET ORAL TWICE A DAY
Qty: 180 TABLET | Refills: 1 | Status: ACTIVE | COMMUNITY

## 2024-04-16 RX ORDER — IBUPROFEN 800 MG/1
TABLET, FILM COATED ORAL
Refills: 0 | Status: ACTIVE | COMMUNITY
Start: 2022-02-21

## 2024-04-16 RX ORDER — OMEPRAZOLE 40 MG/1
TAKE ONE CAPSULE BY MOUTH ONCE A DAY 30 MINS BEFORE BREAKFAST CAPSULE, DELAYED RELEASE ORAL ONCE A DAY
Refills: 1 | Status: ACTIVE | COMMUNITY
Start: 2022-03-28

## 2024-04-16 RX ORDER — FAMOTIDINE 40 MG/1
TABLET, FILM COATED ORAL
Refills: 0 | Status: ACTIVE | COMMUNITY
Start: 2021-08-07

## 2024-04-16 RX ORDER — METOPROLOL SUCCINATE 25 MG/1
TABLET, EXTENDED RELEASE ORAL
Refills: 0 | Status: ACTIVE | COMMUNITY
Start: 2022-01-12

## 2024-04-16 NOTE — HPI-TODAY'S VISIT:
Radha is a pleasant 49-year-old female who presents today for a procedure follow up. Personal history of tubular adenoma  EGD dated 3/8/2024 showed a regular Z-line.  Small hiatal hernia.  Mild inflammation of the gastric antrum.  Evidence of sleeve gastrectomy.  Normal duodenum. Reactive antral gastropathy.  Fundic mucosa with mild reactive changes.  Negative H. pylori.  Negative for active gastritis, dysplasia or malignancy.  GE junction biopsy showed chronic active esophagitis consistent with reflux.  Negative for intestinal metaplasia, dysplasia or malignancy.  Duodenal biopsy showed benign duodenal mucosa.  Negative for villous abnormality, parasites, dysplasia or malignancy  Colonoscopy dated 3/8/2024 showed diverticulosis in the sigmoid colon.  Nonbleeding internal hemorrhoids.  No specimens collected.  Repeat colonoscopy in 1 year due to suboptimal prep  Labs dated 3/6/2024 showed normal iron studies.  Vitamin B12 normal.  Ferritin 13.  Normal hemoglobin.  Normal platelets.  Labs dated 1/9/2024 showed hemoglobin 11.2.  Hematocrit 34.5.  Normal platelets.  Sed rate normal.  Normal CMP.  CRP 15.9.  Hep C antibody negative.  Hep B core antibody IgM negative.  Hep A antibody IgM negative.  Hep B surface antigen negative.  CT renal stone protocol dated 4/28/2023 showed no acute abnormality  MRI/pancreas/MRCP with and without contrast dated 6/27/2022 showed splenomegaly  FibroScan dated 4/18/2022 showed S0 and F0    Ultrasound dated 4/18/2022 showed interval cholecystectomy with normal caliber common bile duct.  Spleen at the upper limits of normal in size but unchanged from prior exam.  Otherwise negative exam    No issues after procedure. S/P gastric sleeve. Notes improvement in her reflux. Currently on omeprazole 40 mg qd and sucralfate PRN. Improvement since switching to Humira. Notes a bowel movement every other day with MiraLAX. Denies nausea, vomiting, dysphagia, odynophagia, hematemesis, coffee-ground emesis, abdominal pain, change in bowel habits, abnormal weight loss, BRBPR or melena. Maternal history of colon cancer at age 51.  No other GI complaints at this time

## 2024-06-14 ENCOUNTER — TELEPHONE ENCOUNTER (OUTPATIENT)
Dept: URBAN - METROPOLITAN AREA CLINIC 63 | Facility: CLINIC | Age: 49
End: 2024-06-14

## 2024-06-14 RX ORDER — OMEPRAZOLE 40 MG/1
1 CAPSULE 30 MINUTES BEFORE MORNING MEAL CAPSULE, DELAYED RELEASE ORAL ONCE A DAY
Qty: 90 | Refills: 3
Start: 2022-03-28

## 2025-02-21 ENCOUNTER — TELEPHONE ENCOUNTER (OUTPATIENT)
Dept: URBAN - METROPOLITAN AREA CLINIC 63 | Facility: CLINIC | Age: 50
End: 2025-02-21